# Patient Record
Sex: FEMALE | Race: ASIAN | NOT HISPANIC OR LATINO | URBAN - METROPOLITAN AREA
[De-identification: names, ages, dates, MRNs, and addresses within clinical notes are randomized per-mention and may not be internally consistent; named-entity substitution may affect disease eponyms.]

---

## 2019-06-02 ENCOUNTER — EMERGENCY (EMERGENCY)
Facility: HOSPITAL | Age: 24
LOS: 1 days | Discharge: HOME | End: 2019-06-02
Attending: EMERGENCY MEDICINE | Admitting: EMERGENCY MEDICINE
Payer: SELF-PAY

## 2019-06-02 VITALS
TEMPERATURE: 98 F | RESPIRATION RATE: 18 BRPM | DIASTOLIC BLOOD PRESSURE: 93 MMHG | OXYGEN SATURATION: 100 % | SYSTOLIC BLOOD PRESSURE: 140 MMHG | HEART RATE: 79 BPM

## 2019-06-02 VITALS
DIASTOLIC BLOOD PRESSURE: 87 MMHG | SYSTOLIC BLOOD PRESSURE: 129 MMHG | RESPIRATION RATE: 15 BRPM | HEART RATE: 74 BPM | OXYGEN SATURATION: 100 %

## 2019-06-02 LAB
APPEARANCE UR: SIGNIFICANT CHANGE UP
BACTERIA # UR AUTO: SIGNIFICANT CHANGE UP
BILIRUB UR-MCNC: NEGATIVE — SIGNIFICANT CHANGE UP
BLOOD UR QL VISUAL: NEGATIVE — SIGNIFICANT CHANGE UP
COLOR SPEC: YELLOW — SIGNIFICANT CHANGE UP
GLUCOSE UR-MCNC: NEGATIVE — SIGNIFICANT CHANGE UP
KETONES UR-MCNC: NEGATIVE — SIGNIFICANT CHANGE UP
LEUKOCYTE ESTERASE UR-ACNC: SIGNIFICANT CHANGE UP
MUCOUS THREADS # UR AUTO: SIGNIFICANT CHANGE UP
NITRITE UR-MCNC: NEGATIVE — SIGNIFICANT CHANGE UP
PH UR: 6 — SIGNIFICANT CHANGE UP (ref 5–8)
PROT UR-MCNC: NEGATIVE — SIGNIFICANT CHANGE UP
RBC CASTS # UR COMP ASSIST: SIGNIFICANT CHANGE UP (ref 0–?)
SP GR SPEC: 1.01 — SIGNIFICANT CHANGE UP (ref 1–1.04)
SQUAMOUS # UR AUTO: SIGNIFICANT CHANGE UP
UROBILINOGEN FLD QL: NORMAL — SIGNIFICANT CHANGE UP
WBC UR QL: SIGNIFICANT CHANGE UP (ref 0–?)

## 2019-06-02 PROCEDURE — 99283 EMERGENCY DEPT VISIT LOW MDM: CPT | Mod: 25

## 2019-06-02 PROCEDURE — 99053 MED SERV 10PM-8AM 24 HR FAC: CPT

## 2019-06-02 RX ORDER — IBUPROFEN 200 MG
600 TABLET ORAL ONCE
Refills: 0 | Status: COMPLETED | OUTPATIENT
Start: 2019-06-02 | End: 2019-06-02

## 2019-06-02 RX ORDER — ACETAMINOPHEN 500 MG
975 TABLET ORAL ONCE
Refills: 0 | Status: COMPLETED | OUTPATIENT
Start: 2019-06-02 | End: 2019-06-02

## 2019-06-02 RX ORDER — IBUPROFEN 200 MG
1 TABLET ORAL
Qty: 20 | Refills: 0
Start: 2019-06-02 | End: 2019-06-06

## 2019-06-02 RX ORDER — LIDOCAINE 4 G/100G
1 CREAM TOPICAL ONCE
Refills: 0 | Status: COMPLETED | OUTPATIENT
Start: 2019-06-02 | End: 2019-06-02

## 2019-06-02 RX ORDER — CEPHALEXIN 500 MG
1 CAPSULE ORAL
Qty: 10 | Refills: 0
Start: 2019-06-02 | End: 2019-06-06

## 2019-06-02 RX ORDER — ACETAMINOPHEN 500 MG
1 TABLET ORAL
Qty: 20 | Refills: 0
Start: 2019-06-02 | End: 2019-06-06

## 2019-06-02 RX ADMIN — Medication 975 MILLIGRAM(S): at 01:31

## 2019-06-02 RX ADMIN — LIDOCAINE 1 PATCH: 4 CREAM TOPICAL at 01:32

## 2019-06-02 RX ADMIN — Medication 600 MILLIGRAM(S): at 01:32

## 2019-06-02 NOTE — ED PROVIDER NOTE - ATTENDING CONTRIBUTION TO CARE
Patient presents with 2 days of constant b/l mid back pain, worse with movement, nonradiating, aching. no associated fevers, chills, weakness, numbness, bowel/bladder incontinence, cp, sob, dysuria, hematuria.  No trauma, weight loss, spinal injections or ivdu.  exam  GEN - NAD; well appearing; A+O x3   HEAD - NC/AT   EYES- PERRL, EOMI  ENT: Airway patent, mmm, Oral cavity and pharynx normal.  NECK: Neck supple, non-tender without lymphadenopathy, no masses.  PULMONARY - CTA b/l, symmetric breath sounds.   CARDIAC -s1s2, RRR, no M,G,R  ABDOMEN - +BS, ND, NT, soft, no guarding, no rebound, no masses   BACK - no CVA tenderness, Normal  spine, no midline ttp-+b/l thoracic paraspinal mild ttp, no deformity, no skin changes.   EXTREMITIES - FROM, no edema   SKIN - no rash or bruising   NEUROLOGIC - alert, speech clear, 5/5 strength in extremities, sensation intact, gaity steady  a/p-patient with 2 days of aching b/l paraspinal back pain, likely muscle strain, neuro intact, well appearing, no other symptoms. Plan for pain ctrl, reass. Patient presents with 2 days of constant b/l mid back pain, worse with movement, nonradiating, aching. no associated fevers, chills, weakness, numbness, bowel/bladder incontinence, cp, sob, dysuria, hematuria.  No trauma, weight loss, spinal injections or ivdu.  exam  GEN - NAD; well appearing; A+O x3   HEAD - NC/AT   EYES- PERRL, EOMI  ENT: Airway patent, mmm, Oral cavity and pharynx normal.  NECK: Neck supple, non-tender without lymphadenopathy, no masses.  PULMONARY - CTA b/l, symmetric breath sounds.   CARDIAC -s1s2, RRR, no M,G,R  ABDOMEN - +BS, ND, NT, soft, no guarding, no rebound, no masses   BACK - no CVA tenderness, Normal  spine, no midline ttp-+b/l thoracic paraspinal mild ttp, no deformity, no skin changes.   EXTREMITIES - FROM, no edema   SKIN - no rash or bruising   NEUROLOGIC - alert, speech clear, 5/5 strength in extremities, sensation intact, gaity steady  a/p-patient with 2 days of aching b/l paraspinal back pain, likely muscle strain, neuro intact, well appearing, no other symptoms. Plan for pain ctrl, ua for possible uti, reass.

## 2019-06-02 NOTE — ED ADULT TRIAGE NOTE - CHIEF COMPLAINT QUOTE
Ambulatory complaining of BL flank pain x2 days. Denies urinary complications, LMP 5/11, BM today. Patient in NAD, calm and cooperative, took Advil around 9pm. Pain 10/10

## 2019-06-02 NOTE — ED PROVIDER NOTE - CLINICAL SUMMARY MEDICAL DECISION MAKING FREE TEXT BOX
23F o/w healthy presenting with back pain and no other sx, improved at home with motrin, will check a u/a for blood / calcium for possible renal stone although suspicion for this is very low, high likelihood of msk, will start apap/ibu/lidoderm, safe for outpatient follow up.

## 2019-06-02 NOTE — ED PROVIDER NOTE - PHYSICAL EXAMINATION
Gen: NAD, non-toxic, conversational  Eyes: PERRLA, EOMI   HENT: Normocephalic, atraumatic. External ears normal, no rhinorrhea, moist mucous membranes.   CV: RRR, no M/R/G  Resp: CTAB, non-labored, speaking without difficulty on room air  Abd: soft, non tender, non rigid, no guarding or rebound tenderness  Back: No CVAT bilaterally, no midline ttp, diffuse mild ttp mid thoracic area  Skin: dry, wwp   Neuro: AOx3, speech is fluent and appropriate  Psych: Mood good, affect euthymic Gen: NAD, non-toxic, conversational  Eyes: PERRLA, EOMI   HENT: Normocephalic, atraumatic. External ears normal, no rhinorrhea, moist mucous membranes.   CV: RRR, no M/R/G  Resp: CTAB, non-labored, speaking without difficulty on room air  Abd: soft, non tender, non rigid, no guarding or rebound tenderness  Back: No CVAT bilaterally, no midline ttp, diffuse mild ttp mid paraspinal thoracic area  Skin: dry, wwp   Neuro: AOx3, speech is fluent and appropriate  Psych: Mood good, affect euthymic

## 2019-06-02 NOTE — ED PROVIDER NOTE - OBJECTIVE STATEMENT
Patient with 2 days of mid back pain, feels like a throbbing, worsens with certain movements, improves with motrin, no shortness of breath, chest pain, fevers or chills, nausea or vomiting, constipation, diarrhea, or other new sx of concern. No headache, dysuria, hematuria, vaginal bleeding, vaginal discharge, no chronic medications, no other medical problems, no antecedent or prodromal symptoms. Patient with 2 days of mid b/l back pain, feels like a throbbing, worsens with certain movements, improves with motrin, no shortness of breath, chest pain, fevers or chills, nausea or vomiting, constipation, diarrhea, or other new sx of concern. No headache, dysuria, hematuria, vaginal bleeding, vaginal discharge, no chronic medications, no other medical problems, no antecedent or prodromal symptoms.

## 2019-06-02 NOTE — ED ADULT NURSE NOTE - OBJECTIVE STATEMENT
Pt received in spot 16, A&Ox4, NAD.  c/o medial back pain. Denies any injury.  Denies any urinary symptoms or constipation.  Denies any abdominal pain/N/V.  Pt appears comfortable.  No labs required at this time.  Medicated as per MD orders.  Will continue to monitor.

## 2019-06-02 NOTE — ED PROVIDER NOTE - PROGRESS NOTE DETAILS
suspect back pain likely msk but will give abx given mildly + ua. return precautions discussed. endorses significant improvement in pain after meds. ao3, ambulatory, tolerating po, stable at dc.

## 2019-06-02 NOTE — ED PROVIDER NOTE - NSFOLLOWUPCLINICS_GEN_ALL_ED_FT
[facility]  Internal Medicine  [phone] [place] [address]  Phone: [phone]  Fax:   Follow Up Time: Routine

## 2025-08-01 NOTE — ED ADULT TRIAGE NOTE - NS ED TRIAGE HISTORIAN
What Type Of Note Output Would You Prefer (Optional)?: Standard Output Hpi Title: Evaluation of Skin Lesions Family Member: Father Patient